# Patient Record
Sex: MALE | URBAN - METROPOLITAN AREA
[De-identification: names, ages, dates, MRNs, and addresses within clinical notes are randomized per-mention and may not be internally consistent; named-entity substitution may affect disease eponyms.]

---

## 2021-03-25 ENCOUNTER — APPOINTMENT (RX ONLY)
Dept: URBAN - METROPOLITAN AREA CLINIC 39 | Facility: CLINIC | Age: 26
Setting detail: DERMATOLOGY
End: 2021-03-25

## 2021-03-25 DIAGNOSIS — L50.1 IDIOPATHIC URTICARIA: ICD-10-CM | Status: INADEQUATELY CONTROLLED

## 2021-03-25 PROBLEM — L30.9 DERMATITIS, UNSPECIFIED: Status: ACTIVE | Noted: 2021-03-25

## 2021-03-25 PROCEDURE — ? TREATMENT REGIMEN

## 2021-03-25 PROCEDURE — 99204 OFFICE O/P NEW MOD 45 MIN: CPT

## 2021-03-25 PROCEDURE — ? ADDITIONAL NOTES

## 2021-03-25 PROCEDURE — ? PRESCRIPTION

## 2021-03-25 PROCEDURE — ? COUNSELING

## 2021-03-25 RX ORDER — PREDNISONE 10 MG/1
TABLET ORAL
Qty: 48 | Refills: 0 | Status: ERX | COMMUNITY
Start: 2021-03-25

## 2021-03-25 RX ADMIN — PREDNISONE: 10 TABLET ORAL at 00:00

## 2021-03-25 ASSESSMENT — LOCATION ZONE DERM: LOCATION ZONE: TRUNK

## 2021-03-25 ASSESSMENT — LOCATION DETAILED DESCRIPTION DERM
LOCATION DETAILED: SUPERIOR LUMBAR SPINE
LOCATION DETAILED: PERIUMBILICAL SKIN

## 2021-03-25 ASSESSMENT — LOCATION SIMPLE DESCRIPTION DERM
LOCATION SIMPLE: ABDOMEN
LOCATION SIMPLE: LOWER BACK

## 2021-03-25 NOTE — PROCEDURE: ADDITIONAL NOTES
Additional Notes: Current morphology is very limited. There are some areas that do appear slightly edematous and urticarial in nature. There are also some areas that slightly appear as eczematous and some like petechia, which may be from pressure or scratching. No improvement from previous use of ketoconazole. Possible slight improvement from a very low dose and short course of prednisone. Advised to try higher doses of his OTC antihistamines. He will call us to let us know what it is so we can instruct him better with this. Will try a stronger prednisone taper at this time. Follow up PRN.
Detail Level: Simple
Render Risk Assessment In Note?: no

## 2021-03-25 NOTE — PROCEDURE: TREATMENT REGIMEN
Initiate Treatment: Prednisone 10mg- Take 60mg for 3 days, 40mg for 3 days, 30mg for 3 days, 20mg for 3 days, then 10mg for 3 days
Otc Regimen: Antihistamine such as Allegra, Zyrtec, or Claritin up to 4x per day
Detail Level: Zone